# Patient Record
Sex: FEMALE | Race: BLACK OR AFRICAN AMERICAN | Employment: PART TIME | URBAN - METROPOLITAN AREA
[De-identification: names, ages, dates, MRNs, and addresses within clinical notes are randomized per-mention and may not be internally consistent; named-entity substitution may affect disease eponyms.]

---

## 2017-08-04 PROCEDURE — 93005 ELECTROCARDIOGRAM TRACING: CPT

## 2017-08-04 PROCEDURE — 99285 EMERGENCY DEPT VISIT HI MDM: CPT

## 2017-08-05 ENCOUNTER — APPOINTMENT (OUTPATIENT)
Dept: CT IMAGING | Age: 47
End: 2017-08-05
Attending: EMERGENCY MEDICINE
Payer: COMMERCIAL

## 2017-08-05 ENCOUNTER — HOSPITAL ENCOUNTER (EMERGENCY)
Age: 47
Discharge: HOME | End: 2017-08-05
Attending: EMERGENCY MEDICINE
Payer: COMMERCIAL

## 2017-08-05 ENCOUNTER — APPOINTMENT (OUTPATIENT)
Dept: ULTRASOUND IMAGING | Age: 47
End: 2017-08-05
Attending: EMERGENCY MEDICINE
Payer: COMMERCIAL

## 2017-08-05 VITALS
HEIGHT: 62 IN | BODY MASS INDEX: 37.41 KG/M2 | SYSTOLIC BLOOD PRESSURE: 169 MMHG | HEART RATE: 103 BPM | RESPIRATION RATE: 17 BRPM | OXYGEN SATURATION: 100 % | TEMPERATURE: 98.3 F | WEIGHT: 203.26 LBS | DIASTOLIC BLOOD PRESSURE: 98 MMHG

## 2017-08-05 DIAGNOSIS — R00.0 TACHYCARDIA: ICD-10-CM

## 2017-08-05 DIAGNOSIS — I10 ESSENTIAL HYPERTENSION: Primary | ICD-10-CM

## 2017-08-05 LAB
ALBUMIN SERPL BCP-MCNC: 3.8 G/DL (ref 3.5–5)
ALBUMIN/GLOB SERPL: 0.9 {RATIO} (ref 1.1–2.2)
ALP SERPL-CCNC: 92 U/L (ref 45–117)
ALT SERPL-CCNC: 27 U/L (ref 12–78)
ANION GAP BLD CALC-SCNC: 7 MMOL/L (ref 5–15)
APPEARANCE UR: CLEAR
AST SERPL W P-5'-P-CCNC: 26 U/L (ref 15–37)
ATRIAL RATE: 105 BPM
BACTERIA URNS QL MICRO: NEGATIVE /HPF
BASOPHILS # BLD AUTO: 0 K/UL (ref 0–0.1)
BASOPHILS # BLD: 0 % (ref 0–1)
BILIRUB SERPL-MCNC: 0.4 MG/DL (ref 0.2–1)
BILIRUB UR QL: NEGATIVE
BUN SERPL-MCNC: 12 MG/DL (ref 6–20)
BUN/CREAT SERPL: 13 (ref 12–20)
CALCIUM SERPL-MCNC: 9.7 MG/DL (ref 8.5–10.1)
CALCULATED P AXIS, ECG09: 59 DEGREES
CALCULATED R AXIS, ECG10: -4 DEGREES
CALCULATED T AXIS, ECG11: 50 DEGREES
CHLORIDE SERPL-SCNC: 105 MMOL/L (ref 97–108)
CK MB CFR SERPL CALC: ABNORMAL % (ref 0–2.5)
CK MB SERPL-MCNC: <1 NG/ML (ref 5–25)
CK SERPL-CCNC: 230 U/L (ref 26–192)
CO2 SERPL-SCNC: 27 MMOL/L (ref 21–32)
COLOR UR: ABNORMAL
CREAT SERPL-MCNC: 0.89 MG/DL (ref 0.55–1.02)
D DIMER PPP FEU-MCNC: 0.69 MG/L FEU (ref 0–0.65)
DIAGNOSIS, 93000: NORMAL
EOSINOPHIL # BLD: 0.2 K/UL (ref 0–0.4)
EOSINOPHIL NFR BLD: 2 % (ref 0–7)
EPITH CASTS URNS QL MICRO: ABNORMAL /LPF
ERYTHROCYTE [DISTWIDTH] IN BLOOD BY AUTOMATED COUNT: 14.2 % (ref 11.5–14.5)
GLOBULIN SER CALC-MCNC: 4.1 G/DL (ref 2–4)
GLUCOSE SERPL-MCNC: 119 MG/DL (ref 65–100)
GLUCOSE UR STRIP.AUTO-MCNC: NEGATIVE MG/DL
HCG UR QL: NEGATIVE
HCT VFR BLD AUTO: 37.6 % (ref 35–47)
HGB BLD-MCNC: 12.8 G/DL (ref 11.5–16)
HGB UR QL STRIP: NEGATIVE
HYALINE CASTS URNS QL MICRO: ABNORMAL /LPF (ref 0–5)
KETONES UR QL STRIP.AUTO: NEGATIVE MG/DL
LEUKOCYTE ESTERASE UR QL STRIP.AUTO: ABNORMAL
LYMPHOCYTES # BLD AUTO: 20 % (ref 12–49)
LYMPHOCYTES # BLD: 1.9 K/UL (ref 0.8–3.5)
MCH RBC QN AUTO: 27.8 PG (ref 26–34)
MCHC RBC AUTO-ENTMCNC: 34 G/DL (ref 30–36.5)
MCV RBC AUTO: 81.7 FL (ref 80–99)
MONOCYTES # BLD: 0.3 K/UL (ref 0–1)
MONOCYTES NFR BLD AUTO: 3 % (ref 5–13)
NEUTS SEG # BLD: 7.1 K/UL (ref 1.8–8)
NEUTS SEG NFR BLD AUTO: 75 % (ref 32–75)
NITRITE UR QL STRIP.AUTO: NEGATIVE
P-R INTERVAL, ECG05: 174 MS
PH UR STRIP: 7.5 [PH] (ref 5–8)
PLATELET # BLD AUTO: 307 K/UL (ref 150–400)
POTASSIUM SERPL-SCNC: 3.2 MMOL/L (ref 3.5–5.1)
PROT SERPL-MCNC: 7.9 G/DL (ref 6.4–8.2)
PROT UR STRIP-MCNC: NEGATIVE MG/DL
Q-T INTERVAL, ECG07: 360 MS
QRS DURATION, ECG06: 90 MS
QTC CALCULATION (BEZET), ECG08: 475 MS
RBC # BLD AUTO: 4.6 M/UL (ref 3.8–5.2)
RBC #/AREA URNS HPF: ABNORMAL /HPF (ref 0–5)
SODIUM SERPL-SCNC: 139 MMOL/L (ref 136–145)
SP GR UR REFRACTOMETRY: 1.01 (ref 1–1.03)
TROPONIN I SERPL-MCNC: <0.04 NG/ML
UA: UC IF INDICATED,UAUC: ABNORMAL
UROBILINOGEN UR QL STRIP.AUTO: 0.2 EU/DL (ref 0.2–1)
VENTRICULAR RATE, ECG03: 105 BPM
WBC # BLD AUTO: 9.4 K/UL (ref 3.6–11)
WBC URNS QL MICRO: ABNORMAL /HPF (ref 0–4)

## 2017-08-05 PROCEDURE — 81001 URINALYSIS AUTO W/SCOPE: CPT | Performed by: EMERGENCY MEDICINE

## 2017-08-05 PROCEDURE — 84484 ASSAY OF TROPONIN QUANT: CPT | Performed by: EMERGENCY MEDICINE

## 2017-08-05 PROCEDURE — 85025 COMPLETE CBC W/AUTO DIFF WBC: CPT | Performed by: EMERGENCY MEDICINE

## 2017-08-05 PROCEDURE — 81025 URINE PREGNANCY TEST: CPT | Performed by: EMERGENCY MEDICINE

## 2017-08-05 PROCEDURE — 74011250636 HC RX REV CODE- 250/636: Performed by: EMERGENCY MEDICINE

## 2017-08-05 PROCEDURE — 93971 EXTREMITY STUDY: CPT

## 2017-08-05 PROCEDURE — 74011636320 HC RX REV CODE- 636/320: Performed by: EMERGENCY MEDICINE

## 2017-08-05 PROCEDURE — 82550 ASSAY OF CK (CPK): CPT | Performed by: EMERGENCY MEDICINE

## 2017-08-05 PROCEDURE — 85379 FIBRIN DEGRADATION QUANT: CPT | Performed by: EMERGENCY MEDICINE

## 2017-08-05 PROCEDURE — 36415 COLL VENOUS BLD VENIPUNCTURE: CPT | Performed by: EMERGENCY MEDICINE

## 2017-08-05 PROCEDURE — 74011250637 HC RX REV CODE- 250/637: Performed by: EMERGENCY MEDICINE

## 2017-08-05 PROCEDURE — 80053 COMPREHEN METABOLIC PANEL: CPT | Performed by: EMERGENCY MEDICINE

## 2017-08-05 PROCEDURE — 71275 CT ANGIOGRAPHY CHEST: CPT

## 2017-08-05 RX ORDER — SUCRALFATE 1 G/10ML
1 SUSPENSION ORAL
Status: COMPLETED | OUTPATIENT
Start: 2017-08-05 | End: 2017-08-05

## 2017-08-05 RX ORDER — SODIUM CHLORIDE 9 MG/ML
50 INJECTION, SOLUTION INTRAVENOUS
Status: COMPLETED | OUTPATIENT
Start: 2017-08-05 | End: 2017-08-05

## 2017-08-05 RX ORDER — FAMOTIDINE 20 MG/1
20 TABLET, FILM COATED ORAL
Status: COMPLETED | OUTPATIENT
Start: 2017-08-05 | End: 2017-08-05

## 2017-08-05 RX ORDER — SODIUM CHLORIDE 0.9 % (FLUSH) 0.9 %
10 SYRINGE (ML) INJECTION
Status: COMPLETED | OUTPATIENT
Start: 2017-08-05 | End: 2017-08-05

## 2017-08-05 RX ADMIN — IOPAMIDOL 100 ML: 755 INJECTION, SOLUTION INTRAVENOUS at 02:25

## 2017-08-05 RX ADMIN — SUCRALFATE 1 G: 1 SUSPENSION ORAL at 00:34

## 2017-08-05 RX ADMIN — Medication 10 ML: at 02:26

## 2017-08-05 RX ADMIN — SODIUM CHLORIDE 50 ML/HR: 900 INJECTION, SOLUTION INTRAVENOUS at 02:25

## 2017-08-05 RX ADMIN — FAMOTIDINE 20 MG: 20 TABLET ORAL at 00:34

## 2017-08-05 NOTE — LETTER
[address] 
[facility] EMERGENCY DEPT 
[address] [place] [address] [address] [id number] [phone] Work/School Note Date: 8/4/2017 To Whom It May concern: 
 
Rabia Brandon was seen and treated today in the emergency room by the following provider(s): 
Attending Provider: Todd Luu MD.   
 
Rabia Brandon may return to work on 8/7/17. Sincerely, 
 
 
 
 
Todd Luu MD

## 2017-08-05 NOTE — DISCHARGE INSTRUCTIONS
DASH Diet: Care Instructions  Your Care Instructions  The DASH diet is an eating plan that can help lower your blood pressure. DASH stands for Dietary Approaches to Stop Hypertension. Hypertension is high blood pressure. The DASH diet focuses on eating foods that are high in calcium, potassium, and magnesium. These nutrients can lower blood pressure. The foods that are highest in these nutrients are fruits, vegetables, low-fat dairy products, nuts, seeds, and legumes. But taking calcium, potassium, and magnesium supplements instead of eating foods that are high in those nutrients does not have the same effect. The DASH diet also includes whole grains, fish, and poultry. The DASH diet is one of several lifestyle changes your doctor may recommend to lower your high blood pressure. Your doctor may also want you to decrease the amount of sodium in your diet. Lowering sodium while following the DASH diet can lower blood pressure even further than just the DASH diet alone. Follow-up care is a key part of your treatment and safety. Be sure to make and go to all appointments, and call your doctor if you are having problems. It's also a good idea to know your test results and keep a list of the medicines you take. How can you care for yourself at home? Following the DASH diet  · Eat 4 to 5 servings of fruit each day. A serving is 1 medium-sized piece of fruit, ½ cup chopped or canned fruit, 1/4 cup dried fruit, or 4 ounces (½ cup) of fruit juice. Choose fruit more often than fruit juice. · Eat 4 to 5 servings of vegetables each day. A serving is 1 cup of lettuce or raw leafy vegetables, ½ cup of chopped or cooked vegetables, or 4 ounces (½ cup) of vegetable juice. Choose vegetables more often than vegetable juice. · Get 2 to 3 servings of low-fat and fat-free dairy each day. A serving is 8 ounces of milk, 1 cup of yogurt, or 1 ½ ounces of cheese. · Eat 6 to 8 servings of grains each day.  A serving is 1 slice of bread, 1 ounce of dry cereal, or ½ cup of cooked rice, pasta, or cooked cereal. Try to choose whole-grain products as much as possible. · Limit lean meat, poultry, and fish to 2 servings each day. A serving is 3 ounces, about the size of a deck of cards. · Eat 4 to 5 servings of nuts, seeds, and legumes (cooked dried beans, lentils, and split peas) each week. A serving is 1/3 cup of nuts, 2 tablespoons of seeds, or ½ cup of cooked beans or peas. · Limit fats and oils to 2 to 3 servings each day. A serving is 1 teaspoon of vegetable oil or 2 tablespoons of salad dressing. · Limit sweets and added sugars to 5 servings or less a week. A serving is 1 tablespoon jelly or jam, ½ cup sorbet, or 1 cup of lemonade. · Eat less than 2,300 milligrams (mg) of sodium a day. If you limit your sodium to 1,500 mg a day, you can lower your blood pressure even more. Tips for success  · Start small. Do not try to make dramatic changes to your diet all at once. You might feel that you are missing out on your favorite foods and then be more likely to not follow the plan. Make small changes, and stick with them. Once those changes become habit, add a few more changes. · Try some of the following:  ¨ Make it a goal to eat a fruit or vegetable at every meal and at snacks. This will make it easy to get the recommended amount of fruits and vegetables each day. ¨ Try yogurt topped with fruit and nuts for a snack or healthy dessert. ¨ Add lettuce, tomato, cucumber, and onion to sandwiches. ¨ Combine a ready-made pizza crust with low-fat mozzarella cheese and lots of vegetable toppings. Try using tomatoes, squash, spinach, broccoli, carrots, cauliflower, and onions. ¨ Have a variety of cut-up vegetables with a low-fat dip as an appetizer instead of chips and dip. ¨ Sprinkle sunflower seeds or chopped almonds over salads. Or try adding chopped walnuts or almonds to cooked vegetables. ¨ Try some vegetarian meals using beans and peas. Add garbanzo or kidney beans to salads. Make burritos and tacos with mashed bush beans or black beans. Where can you learn more? Go to http://ilya-cristi.info/. Enter E846 in the search box to learn more about \"DASH Diet: Care Instructions. \"  Current as of: April 3, 2017  Content Version: 11.3  © 5990-6663 [employer]. Care instructions adapted under license by [employer] (which disclaims liability or warranty for this information). If you have questions about a medical condition or this instruction, always ask your healthcare professional. [place] [address] any warranty or liability for your use of this information. High Blood Pressure: Care Instructions  Your Care Instructions  If your blood pressure is usually above 140/90, you have high blood pressure, or hypertension. That means the top number is 140 or higher or the bottom number is 90 or higher, or both. Despite what a lot of people think, high blood pressure usually doesn't cause headaches or make you feel dizzy or lightheaded. It usually has no symptoms. But it does increase your risk for heart attack, stroke, and kidney or eye damage. The higher your blood pressure, the more your risk increases. Your doctor will give you a goal for your blood pressure. Your goal will be based on your health and your age. An example of a goal is to keep your blood pressure below 140/90. Lifestyle changes, such as eating healthy and being active, are always important to help lower blood pressure. You might also take medicine to reach your blood pressure goal.  Follow-up care is a key part of your treatment and safety. Be sure to make and go to all appointments, and call your doctor if you are having problems. It's also a good idea to know your test results and keep a list of the medicines you take. How can you care for yourself at home?   Medical treatment  · If you stop taking your medicine, your blood pressure will go back up. You may take one or more types of medicine to lower your blood pressure. Be safe with medicines. Take your medicine exactly as prescribed. Call your doctor if you think you are having a problem with your medicine. · Talk to your doctor before you start taking aspirin every day. Aspirin can help certain people lower their risk of a heart attack or stroke. But taking aspirin isn't right for everyone, because it can cause serious bleeding. · See your doctor regularly. You may need to see the doctor more often at first or until your blood pressure comes down. · If you are taking blood pressure medicine, talk to your doctor before you take decongestants or anti-inflammatory medicine, such as ibuprofen. Some of these medicines can raise blood pressure. · Learn how to check your blood pressure at home. Lifestyle changes  · Stay at a healthy weight. This is especially important if you put on weight around the waist. Losing even 10 pounds can help you lower your blood pressure. · If your doctor recommends it, get more exercise. Walking is a good choice. Bit by bit, increase the amount you walk every day. Try for at least 30 minutes on most days of the week. You also may want to swim, bike, or do other activities. · Avoid or limit alcohol. Talk to your doctor about whether you can drink any alcohol. · Try to limit how much sodium you eat to less than 2,300 milligrams (mg) a day. Your doctor may ask you to try to eat less than 1,500 mg a day. · Eat plenty of fruits (such as bananas and oranges), vegetables, legumes, whole grains, and low-fat dairy products. · Lower the amount of saturated fat in your diet. Saturated fat is found in animal products such as milk, cheese, and meat. Limiting these foods may help you lose weight and also lower your risk for heart disease. · Do not smoke. Smoking increases your risk for heart attack and stroke.  If you need help quitting, talk to your doctor about stop-smoking programs and medicines. These can increase your chances of quitting for good. When should you call for help? Call 911 anytime you think you may need emergency care. This may mean having symptoms that suggest that your blood pressure is causing a serious heart or blood vessel problem. Your blood pressure may be over 180/110. For example, call 911 if:  · You have symptoms of a heart attack. These may include:  ¨ Chest pain or pressure, or a strange feeling in the chest.  ¨ Sweating. ¨ Shortness of breath. ¨ Nausea or vomiting. ¨ Pain, pressure, or a strange feeling in the back, neck, jaw, or upper belly or in one or both shoulders or arms. ¨ Lightheadedness or sudden weakness. ¨ A fast or irregular heartbeat. · You have symptoms of a stroke. These may include:  ¨ Sudden numbness, tingling, weakness, or loss of movement in your face, arm, or leg, especially on only one side of your body. ¨ Sudden vision changes. ¨ Sudden trouble speaking. ¨ Sudden confusion or trouble understanding simple statements. ¨ Sudden problems with walking or balance. ¨ A sudden, severe headache that is different from past headaches. · You have severe back or belly pain. Do not wait until your blood pressure comes down on its own. Get help right away. Call your doctor now or seek immediate care if:  · Your blood pressure is much higher than normal (such as 180/110 or higher), but you don't have symptoms. · You think high blood pressure is causing symptoms, such as:  ¨ Severe headache. ¨ Blurry vision. Watch closely for changes in your health, and be sure to contact your doctor if:  · Your blood pressure measures 140/90 or higher at least 2 times. That means the top number is 140 or higher or the bottom number is 90 or higher, or both. · You think you may be having side effects from your blood pressure medicine. · Your blood pressure is usually normal, but it goes above normal at least 2 times.   Where can you learn more? Go to http://ilya-cristi.info/. Enter G123 in the search box to learn more about \"High Blood Pressure: Care Instructions. \"  Current as of: August 8, 2016  Content Version: 11.3  © 2511-7150 [employer]. Care instructions adapted under license by [employer] (which disclaims liability or warranty for this information). If you have questions about a medical condition or this instruction, always ask your healthcare professional. [place] [address] any warranty or liability for your use of this information.

## 2017-08-05 NOTE — ED PROVIDER NOTES
HPI Comments: Matt France is a 50 y.o. female with PMHx significant for GERD and hypercholesteremia presenting ambulatory to [facility] ED with c/o 6/10 anterior chest pain that has been constant all day today. She reports additional sx of nausea and chills. The pt notes that she has experienced similar chest pain before due to her Hx of GERD. She states that she took some Pepto bismol today with no relief of her sx. She reports that Nexium usually works better for her GERD than Pepto Bismol but states that she did not take the Nexium today because she is trying not to take it as much. She reports that she also checked her blood pressure after work today and notes that it was 170/107. She notes that she when she checked it again it lulú to 198/116. She states that her blood pressure is usually in the 140s. She reports taking Hydrochlorothiazide 12.5 mg to control her blood pressure. She denies SOB, headache, blurry vision, fever, n/v/d, decreased appetite or exposure to any new stressors. PCP: Jonny Espinal MD  PSHx: cholecystectomy,    Social History:  (-) Tobacco,   (-) EtOH,      (-) Drugs     There are no other complaints, changes, or physical findings at this time. The history is provided by the patient. Past Medical History:   Diagnosis Date    Gastrointestinal disorder     GERD    Hypertension     Ill-defined condition     High Cholesterol       Past Surgical History:   Procedure Laterality Date    BREAST SURGERY PROCEDURE UNLISTED      reduction    HX CHOLECYSTECTOMY      HX GYN      c section         No family history on file. Social History     Social History    Marital status: SINGLE     Spouse name: N/A    Number of children: N/A    Years of education: N/A     Occupational History    Not on file.      Social History Main Topics    Smoking status: Never Smoker    Smokeless tobacco: Not on file    Alcohol use No    Drug use: No    Sexual activity: Not on file Other Topics Concern    Not on file     Social History Narrative    No narrative on file         ALLERGIES: Review of patient's allergies indicates no known allergies. Review of Systems   Constitutional: Positive for chills. Negative for activity change, appetite change, fever and unexpected weight change. HENT: Negative for congestion. Eyes: Negative for pain and visual disturbance. Respiratory: Negative for cough and shortness of breath. Cardiovascular: Positive for chest pain. Gastrointestinal: Positive for nausea. Negative for abdominal pain, diarrhea and vomiting. Genitourinary: Negative for dysuria. Musculoskeletal: Negative for back pain. Skin: Negative for rash. Neurological: Negative for headaches. All other systems reviewed and are negative. Vitals:    08/05/17 0200 08/05/17 0244 08/05/17 0245 08/05/17 0300   BP: (!) 166/101 (!) 148/96  (!) 169/98   Pulse: (!) 106  (!) 109 (!) 103   Resp: 21  13 17   Temp:       SpO2: 99%  99% 100%   Weight:       Height:                Physical Exam   Constitutional: She is oriented to person, place, and time. She appears well-developed and well-nourished. HENT:   Head: Normocephalic and atraumatic. Mouth/Throat: Oropharynx is clear and moist.   Eyes: Conjunctivae and EOM are normal. Pupils are equal, round, and reactive to light. Right eye exhibits no discharge. Left eye exhibits no discharge. Neck: Normal range of motion. Neck supple. Cardiovascular: Normal heart sounds. Tachycardia present. No murmur heard. Pulmonary/Chest: Effort normal and breath sounds normal. No respiratory distress. She has no wheezes. She has no rales. Abdominal: Soft. Bowel sounds are normal. She exhibits no distension. There is no tenderness. Musculoskeletal: Normal range of motion. Neurological: She is alert and oriented to person, place, and time. No cranial nerve deficit. She exhibits normal muscle tone. Skin: Skin is warm and dry.  No rash noted. She is not diaphoretic. Psychiatric: Her mood appears anxious (mildly). Nursing note and vitals reviewed. MDM  Number of Diagnoses or Management Options  Essential hypertension:   Tachycardia:   Diagnosis management comments:   Sudden and extreme SBP change with ARIA on EKG (without heart strain). Low risk PE but given tachycardia, EKG with left leg pain x2 days, will check dimer with troponin and renal function. Amount and/or Complexity of Data Reviewed  Clinical lab tests: ordered and reviewed  Tests in the radiology section of CPT®: ordered and reviewed  Tests in the medicine section of CPT®: ordered and reviewed  Review and summarize past medical records: yes  Independent visualization of images, tracings, or specimens: yes    Patient Progress  Patient progress: stable    ED Course       Procedures   EKG: normal EKG, normal sinus rhythm, nonspecific ST and T waves changes, sinus tachycardia.     01:30 Discussed ddimer results. Pt updated regarding delay and need for further studies. Await CTA.    03:00AM Discussed CTA results. HR remains elevated 100 at baseline with increased to 110s and 120 when speaking. Pt states she is just nervous. Await US -LE. SBP improved. 4:30 AM  [address] final results have been reviewed with her. She has been counseled regarding her diagnosis. She verbally conveys understanding and agreement of the signs, symptoms, diagnosis, treatment and prognosis .      LABORATORY TESTS:  Patient Vitals for the past 12 hrs:   Temp Pulse Resp BP SpO2   08/05/17 0300 - (!) 103 17 (!) 169/98 100 %   08/05/17 0245 - (!) 109 13 - 99 %   08/05/17 0244 - - - (!) 148/96 -   08/05/17 0200 - (!) 106 21 (!) 166/101 99 %   08/05/17 0132 - 97 13 (!) 157/102 99 %   08/05/17 0100 - 98 16 - 98 %   08/05/17 0017 - (!) 110 18 - 100 %   08/04/17 2349 98.3 °F (36.8 °C) (!) 116 16 (!) 197/97 100 %         IMAGING RESULTS:  CTA CHEST W OR W WO CONT   Final Result INDICATION:  LLE pain, +DDImer      EXAMINATION:  US VENOUS DOPPLER LEFT UNI     COMPARISON: None     FINDINGS:     Duplex venous Doppler examination was performed from the left inguinal ligament  to the mid-calf. Spectral analysis and color wave-form imaging were performed. There is no deep venous thrombosis.     IMPRESSION  IMPRESSION:     No deep venous thrombosis. INDICATION:  r/o PE      EXAM:  CTA Chest with contrast for Pulmonary Embolus     COMPARISON:  June 13, 2009     TECHNIQUE:  Following the uneventful intravenous administration of 100 cc Isovue  599, thin helical axial images were obtained through the chest. 3D image  postprocessing was performed. CT dose reduction was achieved through use of a  standardized protocol tailored for this examination and automatic exposure  control for dose modulation.     FINDINGS:     THYROID: Unremarkable. MEDIASTINUM/OSWALDO: Calcified right hilar lymph nodes. HEART/PERICARDIUM: Unremarkable. PULMONARY ARTERIES:No pulmonary embolism. AORTA:  No aneurysm. LUNGS/PLEURA: No nodule, mass, or airspace disease. INCIDENTALLY IMAGED UPPER ABDOMEN: No significant abnormality. BONES: No destructive bone lesion. ADDITIONAL COMMENTS:  N/A     IMPRESSION  IMPRESSION:  No acute process.       MEDICATIONS GIVEN:  Medications   sucralfate (CARAFATE) 100 mg/mL oral suspension 1 g (1 g Oral Given 8/5/17 0034)   famotidine (PEPCID) tablet 20 mg (20 mg Oral Given 8/5/17 0034)   0.9% sodium chloride infusion (0 mL/hr IntraVENous IV Completed 8/5/17 0255)   iopamidol (ISOVUE-370) 76 % injection 100 mL (100 mL IntraVENous Given 8/5/17 0225)   sodium chloride (NS) flush 10 mL (10 mL IntraVENous Given 8/5/17 0226)       IMPRESSION:  1. Essential hypertension    2. Tachycardia        PLAN:  1. Discharge Medication List as of 8/5/2017  3:08 AM        2.    Follow-up Information     Follow up With Details Comments Contact Info    [facility] EMERGENCY DEPT  If symptoms worsen [address] [facility]  [phone]        Return to ED if worse     DISCHARGE NOTE  3:11 AM  The patient has been re-evaluated and is ready for discharge. Reviewed available results with patient. Counseled pt on diagnosis and care plan. Pt has expressed understanding, and all questions have been answered. Pt agrees with plan and agrees to F/U as recommended, or return to the ED if their sxs worsen. Discharge instructions have been provided and explained to the pt, along with reasons to return to the ED. This note is prepared by Bri Soler, acting as Scribe for Patricia León MD.    Patricia León MD: The scribe's documentation has been prepared under my direction and personally reviewed by me in its entirety. I confirm that the note above accurately reflects all work, treatment, procedures, and medical decision making performed by me.

## 2017-08-05 NOTE — ED NOTES
Assumed care of pt from triage. Pt complaining of chest pain all day. Pt has hx of GERD, HTN, and high cholesterol. Pt denies SOB, nausea, vomiting. Pt took pepto bismol approximately 2230. Pt hypertensive at this time. Pt sinus tachycardia on monitor. Pt in no acute distress at this time. Family at bedside. Pt A&Ox4. Call bell within reach.

## 2017-12-20 ENCOUNTER — APPOINTMENT (OUTPATIENT)
Dept: GENERAL RADIOLOGY | Age: 47
End: 2017-12-20
Attending: EMERGENCY MEDICINE
Payer: COMMERCIAL

## 2017-12-20 ENCOUNTER — HOSPITAL ENCOUNTER (EMERGENCY)
Age: 47
Discharge: HOME | End: 2017-12-20
Attending: EMERGENCY MEDICINE
Payer: COMMERCIAL

## 2017-12-20 VITALS
OXYGEN SATURATION: 99 % | HEART RATE: 78 BPM | TEMPERATURE: 98.4 F | BODY MASS INDEX: 36.47 KG/M2 | RESPIRATION RATE: 16 BRPM | HEIGHT: 62 IN | SYSTOLIC BLOOD PRESSURE: 156 MMHG | WEIGHT: 198.19 LBS | DIASTOLIC BLOOD PRESSURE: 98 MMHG

## 2017-12-20 DIAGNOSIS — I10 ASYMPTOMATIC HYPERTENSION: Primary | ICD-10-CM

## 2017-12-20 LAB
ALBUMIN SERPL-MCNC: 3.8 G/DL (ref 3.5–5)
ALBUMIN/GLOB SERPL: 1 {RATIO} (ref 1.1–2.2)
ALP SERPL-CCNC: 88 U/L (ref 45–117)
ALT SERPL-CCNC: 21 U/L (ref 12–78)
ANION GAP SERPL CALC-SCNC: 6 MMOL/L (ref 5–15)
AST SERPL-CCNC: 21 U/L (ref 15–37)
BASOPHILS # BLD: 0 K/UL (ref 0–0.1)
BASOPHILS NFR BLD: 0 % (ref 0–1)
BILIRUB SERPL-MCNC: 0.4 MG/DL (ref 0.2–1)
BUN SERPL-MCNC: 14 MG/DL (ref 6–20)
BUN/CREAT SERPL: 14 (ref 12–20)
CALCIUM SERPL-MCNC: 9 MG/DL (ref 8.5–10.1)
CHLORIDE SERPL-SCNC: 103 MMOL/L (ref 97–108)
CK SERPL-CCNC: 187 U/L (ref 26–192)
CO2 SERPL-SCNC: 28 MMOL/L (ref 21–32)
CREAT SERPL-MCNC: 0.98 MG/DL (ref 0.55–1.02)
EOSINOPHIL # BLD: 0.1 K/UL (ref 0–0.4)
EOSINOPHIL NFR BLD: 1 % (ref 0–7)
ERYTHROCYTE [DISTWIDTH] IN BLOOD BY AUTOMATED COUNT: 14 % (ref 11.5–14.5)
GLOBULIN SER CALC-MCNC: 4 G/DL (ref 2–4)
GLUCOSE SERPL-MCNC: 91 MG/DL (ref 65–100)
HCT VFR BLD AUTO: 36.1 % (ref 35–47)
HGB BLD-MCNC: 12.4 G/DL (ref 11.5–16)
LYMPHOCYTES # BLD: 2 K/UL (ref 0.8–3.5)
LYMPHOCYTES NFR BLD: 24 % (ref 12–49)
MCH RBC QN AUTO: 27.9 PG (ref 26–34)
MCHC RBC AUTO-ENTMCNC: 34.3 G/DL (ref 30–36.5)
MCV RBC AUTO: 81.3 FL (ref 80–99)
MONOCYTES # BLD: 0.5 K/UL (ref 0–1)
MONOCYTES NFR BLD: 6 % (ref 5–13)
NEUTS SEG # BLD: 5.6 K/UL (ref 1.8–8)
NEUTS SEG NFR BLD: 69 % (ref 32–75)
PLATELET # BLD AUTO: 295 K/UL (ref 150–400)
POTASSIUM SERPL-SCNC: 2.8 MMOL/L (ref 3.5–5.1)
PROT SERPL-MCNC: 7.8 G/DL (ref 6.4–8.2)
RBC # BLD AUTO: 4.44 M/UL (ref 3.8–5.2)
SODIUM SERPL-SCNC: 137 MMOL/L (ref 136–145)
TROPONIN I SERPL-MCNC: <0.04 NG/ML
WBC # BLD AUTO: 8.2 K/UL (ref 3.6–11)

## 2017-12-20 PROCEDURE — 85025 COMPLETE CBC W/AUTO DIFF WBC: CPT | Performed by: EMERGENCY MEDICINE

## 2017-12-20 PROCEDURE — 71010 XR CHEST PORT: CPT

## 2017-12-20 PROCEDURE — 80053 COMPREHEN METABOLIC PANEL: CPT | Performed by: EMERGENCY MEDICINE

## 2017-12-20 PROCEDURE — 74011250637 HC RX REV CODE- 250/637: Performed by: EMERGENCY MEDICINE

## 2017-12-20 PROCEDURE — 99285 EMERGENCY DEPT VISIT HI MDM: CPT

## 2017-12-20 PROCEDURE — 84484 ASSAY OF TROPONIN QUANT: CPT | Performed by: EMERGENCY MEDICINE

## 2017-12-20 PROCEDURE — 93005 ELECTROCARDIOGRAM TRACING: CPT

## 2017-12-20 PROCEDURE — 82550 ASSAY OF CK (CPK): CPT | Performed by: EMERGENCY MEDICINE

## 2017-12-20 PROCEDURE — 36415 COLL VENOUS BLD VENIPUNCTURE: CPT | Performed by: EMERGENCY MEDICINE

## 2017-12-20 RX ORDER — METOPROLOL TARTRATE 25 MG/1
25 TABLET, FILM COATED ORAL
Status: DISCONTINUED | OUTPATIENT
Start: 2017-12-20 | End: 2017-12-20

## 2017-12-20 RX ORDER — POTASSIUM CHLORIDE 750 MG/1
40 TABLET, FILM COATED, EXTENDED RELEASE ORAL
Status: COMPLETED | OUTPATIENT
Start: 2017-12-20 | End: 2017-12-20

## 2017-12-20 RX ADMIN — POTASSIUM CHLORIDE 40 MEQ: 750 TABLET, FILM COATED, EXTENDED RELEASE ORAL at 18:38

## 2017-12-20 NOTE — ED NOTES
Patient ambulatory to ED with c/o high blood pressure. Patient checks her own BP at home and states it was high this morning. Patient also stated she was feeling lightheaded today. Patient denies chest pain, SOB or dizziness.

## 2017-12-20 NOTE — DISCHARGE INSTRUCTIONS

## 2017-12-20 NOTE — ED PROVIDER NOTES
EMERGENCY DEPARTMENT HISTORY AND PHYSICAL EXAM      Date: 12/20/2017  Patient Name: Sarah Dawson    History of Presenting Illness     Chief Complaint   Patient presents with    Dizziness     Ambulatory into the ED with c/o dizziness and high BP readings throughout the day-151/100 this morning and 154/107 pta. Denies CP, SOB. BP currently 192/119    Hypertension       History Provided By: Patient    HPI: Sarah Dawson, 50 y.o. female with PMHx significant for HTN and HLD, presents ambulatory to the ED with cc of intermittent episodes of light-headedness and a pulsing, generalized headache that started yesterday. The patient states that she took two doses of Tylenol with complete relief. She is currently denying any light-headedness of headaches at this time. She also c/o elevated blood pressure, and she reports that her blood pressure was 150s/100s this morning. She states that she is currently prescribed 10mg/12mg of Lisinopril/HCTZ and Metoprolol 25 mg. She states that her PCP has been adjusting her blood pressure medications recently. She specifically denies chest pain, SOB, abdominal pain, back pain, or other complaints at this time. There are no other complaints, changes, or physical findings at this time. PCP: Jonny Espinal MD    Current Outpatient Prescriptions   Medication Sig Dispense Refill    rosuvastatin (CRESTOR) 10 mg tablet Take 10 mg by mouth nightly.  Hydrochlorothiazide (HYDRODIURIL) 12.5 mg tablet Take 12.5 mg by mouth daily.  esomeprazole (NEXIUM) 20 mg capsule Take 20 mg by mouth daily.  lidocaine (XYLOCAINE) 2 % solution Take 10 mL by mouth as needed for Pain.  100 mL 1       Past History     Past Medical History:  Past Medical History:   Diagnosis Date    Gastrointestinal disorder     GERD    Hypertension     Ill-defined condition     High Cholesterol       Past Surgical History:  Past Surgical History:   Procedure Laterality Date    BREAST SURGERY PROCEDURE UNLISTED      reduction    HX CHOLECYSTECTOMY      HX GYN      c section       Family History:  History reviewed. No pertinent family history. Social History:  Social History   Substance Use Topics    Smoking status: Never Smoker    Smokeless tobacco: Never Used    Alcohol use No       Allergies:  No Known Allergies      Review of Systems   Review of Systems   Constitutional: Negative for chills and fever. Respiratory: Negative for cough and shortness of breath. Cardiovascular: Negative for chest pain. Gastrointestinal: Negative for abdominal pain, constipation, diarrhea, nausea and vomiting. Musculoskeletal: Negative for back pain. Neurological: Positive for light-headedness and headaches. Negative for weakness and numbness. All other systems reviewed and are negative. Physical Exam   Physical Exam   Constitutional: She is oriented to person, place, and time. She appears well-developed and well-nourished. HENT:   Head: Normocephalic. Eyes: EOM are normal. Pupils are equal, round, and reactive to light. Neck: Normal range of motion. Cardiovascular: Normal rate and regular rhythm. Pulmonary/Chest: Effort normal and breath sounds normal.   Abdominal: Soft. She exhibits no distension. There is no tenderness. Neurological: She is alert and oriented to person, place, and time. No cranial nerve deficit. CN 2-12 intact, 5/5 strength in all 4 extremities, Finger to nose intact. Skin: Skin is warm and dry. Psychiatric: She has a normal mood and affect. Nursing note and vitals reviewed.       Diagnostic Study Results     Labs -     Recent Results (from the past 12 hour(s))   EKG, 12 LEAD, INITIAL    Collection Time: 12/20/17  5:03 PM   Result Value Ref Range    Ventricular Rate 84 BPM    Atrial Rate 84 BPM    P-R Interval 166 ms    QRS Duration 94 ms    Q-T Interval 406 ms    QTC Calculation (Bezet) 479 ms    Calculated P Axis 55 degrees    Calculated R Axis 8 degrees Calculated T Axis 54 degrees    Diagnosis       Normal sinus rhythm  Cannot rule out Anterior infarct (cited on or before 31-MAR-2010)  When compared with ECG of 04-AUG-2017 23:54,  No significant change was found     CBC WITH AUTOMATED DIFF    Collection Time: 12/20/17  5:20 PM   Result Value Ref Range    WBC 8.2 3.6 - 11.0 K/uL    RBC 4.44 3.80 - 5.20 M/uL    HGB 12.4 11.5 - 16.0 g/dL    HCT 36.1 35.0 - 47.0 %    MCV 81.3 80.0 - 99.0 FL    MCH 27.9 26.0 - 34.0 PG    MCHC 34.3 30.0 - 36.5 g/dL    RDW 14.0 11.5 - 14.5 %    PLATELET 794 108 - 113 K/uL    NEUTROPHILS 69 32 - 75 %    LYMPHOCYTES 24 12 - 49 %    MONOCYTES 6 5 - 13 %    EOSINOPHILS 1 0 - 7 %    BASOPHILS 0 0 - 1 %    ABS. NEUTROPHILS 5.6 1.8 - 8.0 K/UL    ABS. LYMPHOCYTES 2.0 0.8 - 3.5 K/UL    ABS. MONOCYTES 0.5 0.0 - 1.0 K/UL    ABS. EOSINOPHILS 0.1 0.0 - 0.4 K/UL    ABS. BASOPHILS 0.0 0.0 - 0.1 K/UL   METABOLIC PANEL, COMPREHENSIVE    Collection Time: 12/20/17  5:20 PM   Result Value Ref Range    Sodium 137 136 - 145 mmol/L    Potassium 2.8 (L) 3.5 - 5.1 mmol/L    Chloride 103 97 - 108 mmol/L    CO2 28 21 - 32 mmol/L    Anion gap 6 5 - 15 mmol/L    Glucose 91 65 - 100 mg/dL    BUN 14 6 - 20 MG/DL    Creatinine 0.98 0.55 - 1.02 MG/DL    BUN/Creatinine ratio 14 12 - 20      GFR est AA >60 >60 ml/min/1.73m2    GFR est non-AA >60 >60 ml/min/1.73m2    Calcium 9.0 8.5 - 10.1 MG/DL    Bilirubin, total 0.4 0.2 - 1.0 MG/DL    ALT (SGPT) 21 12 - 78 U/L    AST (SGOT) 21 15 - 37 U/L    Alk.  phosphatase 88 45 - 117 U/L    Protein, total 7.8 6.4 - 8.2 g/dL    Albumin 3.8 3.5 - 5.0 g/dL    Globulin 4.0 2.0 - 4.0 g/dL    A-G Ratio 1.0 (L) 1.1 - 2.2     CK W/ REFLX CKMB    Collection Time: 12/20/17  5:20 PM   Result Value Ref Range     26 - 192 U/L   TROPONIN I    Collection Time: 12/20/17  5:20 PM   Result Value Ref Range    Troponin-I, Qt. <0.04 <0.05 ng/mL       Radiologic Studies -   CXR Results  (Last 48 hours)               12/20/17 0396  XR CHEST PORT Final result    Impression:  IMPRESSION:   No acute finding       Narrative:  INDICATION: Chest pain and hypertension       COMPARISON: 1/7/2015       A single frontal view was obtained. The time of this study is 1821 hours. Lungs   are clear. Heart and mediastinal shadows are stable. .                            Medical Decision Making   I am the first provider for this patient. I reviewed the vital signs, available nursing notes, past medical history, past surgical history, family history and social history. Vital Signs-Reviewed the patient's vital signs. Patient Vitals for the past 12 hrs:   Temp Pulse Resp BP SpO2   12/20/17 1830 - 78 16 (!) 156/98 99 %   12/20/17 1815 - 73 14 (!) 170/94 100 %   12/20/17 1800 - 74 16 (!) 146/91 100 %   12/20/17 1745 - 87 20 (!) 165/92 -   12/20/17 1702 - - - (!) 192/119 -   12/20/17 1701 98.4 °F (36.9 °C) 76 11 (!) 187/114 100 %     Cardiac Monitor:   Rate: 85 bpm  Rhythm: Normal Sinus Rhythm     EKG interpretation: (Preliminary) 17:03  Rhythm: normal sinus rhythm; and regular . Rate (approx.): 84; Axis: normal; NH interval: normal; QRS interval: normal ; ST/T wave: normal; Other findings: normal.  Written by MARTHA Gamble, as dictated by Caio Lomas MD.    Records Reviewed: Nursing Notes and Old Medical Records    Provider Notes (Medical Decision Making):   Patient presents with asymptomatic hypertension. Likely essential hypertension rather than secondary from renal or endocrine cause. No symptoms like CP or SOB or HA to be concerned about end-organ damage and malignant hypertension at this time. Discussed sodium restriction, maintaining ideal body weight and regular exercise program as physiologic means to achieve blood pressure control. The patient will strive towards this.  Meanwhile, it is appropriate to lower BP with medications, while observing for therapeutic effect and if appropriate later, can discuss discontinuing medications with his primary care physician if physiologic methods appear to be effective. The patient indicates understanding of these issues and agrees with the plan. We have agreed that continuing to lower BP in the Emergency Room at this point could be more deleterious than therapeutic. Discussed the long term sequelae for elevated blood pressure including blindness, CVA, MI, and renal failure/ dialysis. Pt agrees to follow up as directed. ED Course:   Initial assessment performed. The patients presenting problems have been discussed, and they are in agreement with the care plan formulated and outlined with them. I have encouraged them to ask questions as they arise throughout their visit. Progress Note:  5:46 PM  The patient was informed of the plan to give her a dose of her Metoprolol at this time. Pt is requesting that she take her home dose of Metoprolol after she is discharged home. Pt was informed of her CBC results, and she conveys her understanding of these results. Pending CMP. Written by Kina Allison ED Scribe, as dictated by Jaiden Cardoso MD.    Progress Note:  6:40 PM  The pt was informed of the rest of her results, and she conveys her understanding. Will discharge. Written by Kina Allison ED Scribdorota, as dictated by Jaiden Cardoso MD.    Critical Care Time: 0 minutes    Discharge Note:  6:45 PM  The pt is ready for discharge. The pt's signs, symptoms, diagnosis, and discharge instructions have been discussed and pt has conveyed their understanding. The pt is to follow up as recommended or return to ER should their symptoms worsen. Plan has been discussed and pt is in agreement. PLAN:  1. Discharge Medication List as of 12/20/2017  6:21 PM        2. Follow-up Information     Follow up With Details Comments Contact Tommy Espinal MD  As needed Patient can only remember the practice name and not the physician          Return to ED if worse     Diagnosis     Clinical Impression:   1.  Asymptomatic hypertension        Attestations: This note is prepared by Daniel Jara, acting as a Scribe for Fabiola Mark MD.    Fabiola Mark MD: The scribe's documentation has been prepared under my direction and personally reviewed by me in its entirety. I confirm that the notes above accurately reflects all work, treatment, procedures, and medical decision making performed by me. This note will not be viewable in [address]

## 2017-12-20 NOTE — ED NOTES
MD has reviewed discharge instructions with the patient. The patient verbalized understanding. Patient left ambulatory.

## 2017-12-21 LAB
ATRIAL RATE: 84 BPM
CALCULATED P AXIS, ECG09: 55 DEGREES
CALCULATED R AXIS, ECG10: 8 DEGREES
CALCULATED T AXIS, ECG11: 54 DEGREES
DIAGNOSIS, 93000: NORMAL
P-R INTERVAL, ECG05: 166 MS
Q-T INTERVAL, ECG07: 406 MS
QRS DURATION, ECG06: 94 MS
QTC CALCULATION (BEZET), ECG08: 479 MS
VENTRICULAR RATE, ECG03: 84 BPM

## 2018-08-30 ENCOUNTER — HOSPITAL ENCOUNTER (EMERGENCY)
Age: 48
Discharge: HOME | End: 2018-08-30
Attending: EMERGENCY MEDICINE
Payer: COMMERCIAL

## 2018-08-30 VITALS
SYSTOLIC BLOOD PRESSURE: 158 MMHG | TEMPERATURE: 98.4 F | OXYGEN SATURATION: 99 % | HEART RATE: 79 BPM | RESPIRATION RATE: 14 BRPM | WEIGHT: 207.89 LBS | BODY MASS INDEX: 38.26 KG/M2 | DIASTOLIC BLOOD PRESSURE: 92 MMHG | HEIGHT: 62 IN

## 2018-08-30 DIAGNOSIS — M54.16 LEFT LUMBAR RADICULOPATHY: Primary | ICD-10-CM

## 2018-08-30 LAB
GLUCOSE BLD STRIP.AUTO-MCNC: 114 MG/DL (ref 65–100)
SERVICE CMNT-IMP: ABNORMAL

## 2018-08-30 PROCEDURE — 82962 GLUCOSE BLOOD TEST: CPT

## 2018-08-30 PROCEDURE — 99283 EMERGENCY DEPT VISIT LOW MDM: CPT

## 2018-08-30 RX ORDER — METHYLPREDNISOLONE 4 MG/1
TABLET ORAL
Qty: 1 DOSE PACK | Refills: 0 | Status: SHIPPED | OUTPATIENT
Start: 2018-08-30

## 2018-08-30 RX ORDER — NAPROXEN 500 MG/1
500 TABLET ORAL
Qty: 20 TAB | Refills: 0 | Status: SHIPPED | OUTPATIENT
Start: 2018-08-30

## 2018-08-30 NOTE — ED PROVIDER NOTES
EMERGENCY DEPARTMENT HISTORY AND PHYSICAL EXAM 
 
 
Date: 8/30/2018 Patient Name: Sarah Dawson History of Presenting Illness Chief Complaint Patient presents with  Leg Pain History Provided By: Patient HPI: Sarah Dawson, 46 y.o. female with PMHx significant for GERD, HTN, hypercholesterolemia, presents ambulatory to the ED with cc of new onset of constant, aching LLE pain from the upper thigh to the left knee and down the left lower leg persisting over the last 1-2 weeks. Pt reports associated sx of nausea as well. She expresses over the last week she has had constant pain with intermittent shooting \"pins and needles\" in her left upper thigh noting upon waking up this morning her pain was localized to the left knee down to the foot. Pt discloses there are no exacerbating factors to her discomfort and has found no relief with Ibuprofen leading her to the ED for evaluation. She states she is sedentary while at work and denies any recent increase in exertion to cause her pain. Pt denies any h/o DM, sciatica, or arthritis. Pt denies any recent falls or trauma to cause her discomfort. She denies any fevers, chills, chest pain, SOB, abdominal pain, vomiting, diarrhea, dysuria, hematuria, gait problem, or hip pain. There are no other complaints, changes, or physical findings at this time. PCP: Renata Rodriguez MD 
 
Current Outpatient Prescriptions Medication Sig Dispense Refill  rosuvastatin (CRESTOR) 10 mg tablet Take 10 mg by mouth nightly.  Hydrochlorothiazide (HYDRODIURIL) 12.5 mg tablet Take 12.5 mg by mouth daily.  esomeprazole (NEXIUM) 20 mg capsule Take 20 mg by mouth daily.  lidocaine (XYLOCAINE) 2 % solution Take 10 mL by mouth as needed for Pain. 100 mL 1 Past History Past Medical History: 
Past Medical History:  
Diagnosis Date  Gastrointestinal disorder GERD  Hypertension  Ill-defined condition High Cholesterol Past Surgical History: 
Past Surgical History:  
Procedure Laterality Date  BREAST SURGERY PROCEDURE UNLISTED    
 reduction  HX CHOLECYSTECTOMY  HX GYN    
 c section Family History: No family history on file. Social History: 
Social History Substance Use Topics  Smoking status: Never Smoker  Smokeless tobacco: Never Used  Alcohol use No  
 
 
Allergies: 
No Known Allergies Review of Systems Review of Systems Constitutional: Negative for chills and fever. Respiratory: Negative for cough and shortness of breath. Cardiovascular: Negative for chest pain. Gastrointestinal: Positive for nausea. Negative for abdominal pain, constipation, diarrhea and vomiting. Genitourinary: Negative for dysuria and hematuria. Musculoskeletal: Positive for myalgias (left thigh to the knee and down lower leg ). Negative for arthralgias (hip pain ) and gait problem. Neurological: Positive for numbness (tingling LLE ). Negative for weakness. All other systems reviewed and are negative. Physical Exam  
Physical Exam  
Constitutional: She is oriented to person, place, and time. She appears well-developed and well-nourished. Ambulatory to the room HENT:  
Head: Normocephalic and atraumatic. Eyes: Conjunctivae and EOM are normal.  
Neck: Normal range of motion. Neck supple. Cardiovascular: Normal rate and regular rhythm. Pulmonary/Chest: Effort normal and breath sounds normal. No respiratory distress. Abdominal: Soft. She exhibits no distension. There is no tenderness. Musculoskeletal: Normal range of motion. (-) SLR Minimal tenderness over left lateral thigh No evidence of swelling Neurological: She is alert and oriented to person, place, and time. Skin: Skin is warm and dry. Psychiatric: She has a normal mood and affect. Nursing note and vitals reviewed. Diagnostic Study Results Medical Decision Making I am the first provider for this patient. I reviewed the vital signs, available nursing notes, past medical history, past surgical history, family history and social history. Vital Signs-Reviewed the patient's vital signs. Patient Vitals for the past 12 hrs: 
 Temp Pulse Resp BP SpO2  
08/30/18 0642 98.4 °F (36.9 °C) 79 14 (!) 158/92 99 % Pulse Oximetry Analysis - 99% on room air Cardiac Monitor:  
Rate: 79 bpm 
Rhythm: Normal Sinus Rhythm Records Reviewed: Nursing Notes and Old Medical Records Provider Notes (Medical Decision Making): DDx: Lumbar radiculopathy, sciatica, IT band inflammation, Muscle strain, Overuse injury, Diabetic neuropathy will get PO glucose. ED Course:  
Initial assessment performed. The patients presenting problems have been discussed, and they are in agreement with the care plan formulated and outlined with them. I have encouraged them to ask questions as they arise throughout their visit. Progress Notes: 
 
7:02 AM  
The pt has been re-evaluated. She was updated on plan for discharge with instructions to stretch the affected area with steroid rx and anti-inflammatory. She verbalizes understanding and is stable for discharge. Critical Care Time: 0 minutes Disposition: 
Discharge Note: 
7:01 AM 
The patient is ready for discharge. The patient's signs, symptoms, diagnosis, and discharge instruction have been discussed and the patient has conveyed their understanding. The patient is to follow up as recommended or return to the ER should their symptoms worsen. Plan has been discussed and the patient is in agreement. Written by Jethro Phanibdorota, as dictated by Rajeev Esquivel M.D PLAN: 
1. Current Discharge Medication List  
  
START taking these medications Details  
methylPREDNISolone (MEDROL, OTILIA,) 4 mg tablet As directed Qty: 1 Dose Pack, Refills: 0  
  
naproxen (NAPROSYN) 500 mg tablet Take 1 Tab by mouth every twelve (12) hours as needed for Pain. Qty: 20 Tab, Refills: 0  
  
  
 
2. Follow-up Information Follow up With Details Comments Contact Info Phys Other, MD Schedule an appointment as soon as possible for a visit  Patient can only remember the practice name and not the physician Return to ED if worse Diagnosis Clinical Impression: 1. Left lumbar radiculopathy Attestations: 
 
Attestation: This note is prepared by Colonel Silva. Jose, acting as Scribe for Mary Biggs M.D. Mary Biggs M.D: The scribe's documentation has been prepared under my direction and personally reviewed by me in its entirety. I confirm that the note above accurately reflects all work, treatment, procedures, and medical decision making performed by me.

## 2018-08-30 NOTE — ED NOTES
Pt discharged by MD Cele Hannah. Pt provided with discharge instructions Rx and instructions on follow up care. Pt out of ED in stable condition accompanied by self.

## 2018-08-30 NOTE — ED NOTES
Assumed care of patient at this time. She says that for about a week she has had left leg pain that is from her hip to her foot. She has been taking ibuprofen at home, but the pain has gotten worse. She said when she woke up this morning the pain was more severe in her knee. Pt complains of intermittent tingling in her foot and tenderness to her inner thigh. Pt denies injury to the leg

## 2018-08-30 NOTE — LETTER
[address] 
[facility] EMERGENCY DEPT 
[address] [place] [address] [address] [id number] 
[phone] Work/School Note Date: 8/30/2018 To Whom It May concern: 
 
Gian Mullins was seen and treated today in the emergency room by the following provider(s): 
Attending Provider: Alvino Hayes MD.   
 
Gina Mullins may return to work on 8/30/18, but should not lift anything more than 10lbs for 1 week. Sincerely, Alvino Hayes MD

## 2019-02-25 ENCOUNTER — HOSPITAL ENCOUNTER (EMERGENCY)
Age: 49
Discharge: HOME | End: 2019-02-25
Attending: EMERGENCY MEDICINE
Payer: COMMERCIAL

## 2019-02-25 ENCOUNTER — APPOINTMENT (OUTPATIENT)
Dept: GENERAL RADIOLOGY | Age: 49
End: 2019-02-25
Attending: EMERGENCY MEDICINE
Payer: COMMERCIAL

## 2019-02-25 VITALS
OXYGEN SATURATION: 100 % | DIASTOLIC BLOOD PRESSURE: 95 MMHG | TEMPERATURE: 98.3 F | SYSTOLIC BLOOD PRESSURE: 150 MMHG | HEART RATE: 73 BPM | BODY MASS INDEX: 38.02 KG/M2 | HEIGHT: 62 IN | RESPIRATION RATE: 16 BRPM

## 2019-02-25 DIAGNOSIS — J20.9 ACUTE BRONCHITIS, UNSPECIFIED ORGANISM: Primary | ICD-10-CM

## 2019-02-25 DIAGNOSIS — J02.9 ACUTE PHARYNGITIS, UNSPECIFIED ETIOLOGY: ICD-10-CM

## 2019-02-25 DIAGNOSIS — R03.0 ELEVATED BLOOD PRESSURE READING: ICD-10-CM

## 2019-02-25 PROCEDURE — 99282 EMERGENCY DEPT VISIT SF MDM: CPT

## 2019-02-25 PROCEDURE — 71046 X-RAY EXAM CHEST 2 VIEWS: CPT

## 2019-02-25 RX ORDER — PROMETHAZINE HYDROCHLORIDE AND DEXTROMETHORPHAN HYDROBROMIDE 6.25; 15 MG/5ML; MG/5ML
5 SYRUP ORAL
Qty: 118 ML | Refills: 0 | Status: SHIPPED | OUTPATIENT
Start: 2019-02-25 | End: 2019-03-03

## 2019-02-25 RX ORDER — PREDNISONE 10 MG/1
TABLET ORAL
Qty: 21 TAB | Refills: 0 | Status: SHIPPED | OUTPATIENT
Start: 2019-02-25

## 2019-02-25 RX ORDER — AZITHROMYCIN 250 MG/1
TABLET, FILM COATED ORAL
Qty: 6 TAB | Refills: 0 | Status: SHIPPED | OUTPATIENT
Start: 2019-02-25 | End: 2019-03-02

## 2019-02-25 NOTE — ED NOTES
Discharge instructions given to patient by [facility]. Pt verbalized understanding of discharge instructions. Pt discharged without difficulty. Pt. Discharged in stable condition via ambulation , accompanied by self.

## 2019-02-25 NOTE — ED NOTES
OTTO Rahman have reviewed discharge instructions with the patient. The patient verbalized understanding.

## 2019-02-25 NOTE — DISCHARGE INSTRUCTIONS
Rest, push fluids, alternate Tylenol and Motrin for fever, and follow up with PCP for recheck. Avoid any and all tobacco products. Return to the emergency department for any worsening fever, cough, chest pain, shortness of breath, decreased oral intake, or decreased urine output.

## 2019-02-25 NOTE — LETTER
[facility] EMERGENCY DEPT 
[address] [place] [address] [phone] [id number] [phone] Work/School Note Date: 2/25/2019 To Whom It May concern: 
 
Gloria Marie was seen and treated today in the emergency room. She may return to work in 1 to 2 days, as symptoms improve. Sincerely, Kelli Da Silva, [address]

## 2019-02-26 NOTE — ED PROVIDER NOTES
EMERGENCY DEPARTMENT HISTORY AND PHYSICAL EXAM 
 
 
Date: 2/25/2019 Patient Name: Soniya Owens History of Presenting Illness Chief Complaint Patient presents with  Sore Throat  
  onset Saturday  Cough  
  productive, green in color History Provided By: Patient HPI: Soniya Owens, 46 y.o. female presents ambulatory to the ED with c/o onset sore throat and cough on 2/23/19. Pt states she has been \"surrounded by sick people at [facility] person beside me had pneumonia\". Pt reported mild congestion but denied sinus pressure/pain. No fever/chills. Pt denied wheezes or bronchospasm. Pt denied h/o Asthma/COPD but has had bronchitis in the past.  She is a non smoker. No N/V/D. No chest pain or shortness of breath. Chief Complaint: sore throat, cough Duration: 3 Days Timing:  Acute Location: chest, throat Quality: Aching and Burning Severity: Moderate Modifying Factors: increased discomfort with swallowing Associated Symptoms: denies any other associated signs or symptoms There are no other complaints, changes, or physical findings at this time. PCP: Jonny Espinal MD 
 
Current Outpatient Medications Medication Sig Dispense Refill  azithromycin (ZITHROMAX Z-OTILIA) 250 mg tablet Take as directed 6 Tab 0  promethazine-dextromethorphan (PROMETHAZINE-DM) 6.25-15 mg/5 mL syrup Take 5 mL by mouth every four (4) hours as needed for Cough for up to 6 days. 118 mL 0  
 predniSONE (STERAPRED DS) 10 mg dose pack Take as directed 21 Tab 0  
 hydroCHLOROthiazide 12.5 mg cap 12.5 mg, lisinopril 5 mg tab 10 mg Take 1 Dose by mouth daily.  rosuvastatin (CRESTOR) 10 mg tablet Take 10 mg by mouth nightly.  methylPREDNISolone (MEDROL, OTILIA,) 4 mg tablet As directed 1 Dose Pack 0  
 naproxen (NAPROSYN) 500 mg tablet Take 1 Tab by mouth every twelve (12) hours as needed for Pain. 20 Tab 0  
 esomeprazole (NEXIUM) 20 mg capsule Take 20 mg by mouth daily.  lidocaine (XYLOCAINE) 2 % solution Take 10 mL by mouth as needed for Pain. 100 mL 1 Past History Past Medical History: 
Past Medical History:  
Diagnosis Date  Gastrointestinal disorder GERD  Hypertension  Ill-defined condition High Cholesterol Past Surgical History: 
Past Surgical History:  
Procedure Laterality Date  BREAST SURGERY PROCEDURE UNLISTED    
 reduction  HX CHOLECYSTECTOMY  HX GYN    
 c section Family History: 
History reviewed. No pertinent family history. Social History: 
Social History Tobacco Use  Smoking status: Never Smoker  Smokeless tobacco: Never Used Substance Use Topics  Alcohol use: No  
 Drug use: No  
 
 
Allergies: 
No Known Allergies Review of Systems Review of Systems Constitutional: Negative for chills and fever. HENT: Positive for congestion, postnasal drip and sore throat. Negative for rhinorrhea, sinus pressure, sinus pain and trouble swallowing. Eyes: Negative for discharge and redness. Respiratory: Positive for cough. Negative for shortness of breath and wheezing. Cardiovascular: Negative for chest pain and palpitations. Gastrointestinal: Negative for abdominal pain, diarrhea, nausea and vomiting. Endocrine: Negative for polydipsia, polyphagia and polyuria. Genitourinary: Negative for dysuria and hematuria. Musculoskeletal: Negative for neck pain and neck stiffness. Skin: Negative for rash and wound. Allergic/Immunologic: Negative for food allergies and immunocompromised state. Neurological: Negative for dizziness and headaches. Psychiatric/Behavioral: Negative for agitation and confusion. Physical Exam  
Physical Exam  
Constitutional: She is oriented to person, place, and time. She appears well-developed and well-nourished. No distress. WDWN AA female, alert, in NAD HENT:  
Head: Normocephalic and atraumatic. Mouth/Throat: No oropharyngeal exudate. Boggy nasal mucosa, clear rhinorrhea, posterior oropharynx injected without exudate. Increased effusion noted to bilat TMs, no erythema, good light reflex noted. Eyes: Conjunctivae and EOM are normal. Pupils are equal, round, and reactive to light. Right eye exhibits no discharge. Left eye exhibits no discharge. No scleral icterus. Neck: Normal range of motion. Neck supple. No JVD present. No tracheal deviation present. No thyromegaly present. Cardiovascular: Normal rate, regular rhythm and normal heart sounds. Pulmonary/Chest: Effort normal and breath sounds normal. No respiratory distress. She has no wheezes. Moderate nonproductive noted during exam, no appreciable wheezes Abdominal: Soft. There is no tenderness. Musculoskeletal: Normal range of motion. She exhibits no edema. Lymphadenopathy:  
  She has no cervical adenopathy. Neurological: She is alert and oriented to person, place, and time. She exhibits normal muscle tone. Coordination normal.  
Skin: Skin is warm and dry. She is not diaphoretic. No pallor. Psychiatric: She has a normal mood and affect. Her behavior is normal. Judgment normal.  
Nursing note and vitals reviewed. Diagnostic Study Results Labs - No results found for this or any previous visit (from the past 12 hour(s)). Radiologic Studies -  
XR CHEST PA LAT Final Result IMPRESSION: No acute abnormality. CXR Results  (Last 48 hours) 02/25/19 1815  XR CHEST PA LAT Final result Impression:  IMPRESSION: No acute abnormality. Narrative:  EXAM:  XR CHEST PA LAT. INDICATION: Cough. COMPARISON: 12/20/2017. FINDINGS:   
PA and lateral radiographs of the chest were obtained. Lungs: The lungs are clear of mass, nodule, airspace disease or edema. Pleura: There is no pleural effusion or pneumothorax.   
Mediastinum: The cardiac and mediastinal contours and pulmonary vascularity are  
normal.  
 Bones and soft tissues: There are mild degenerative changes of the spine. There  
are surgical clips in the right upper quadrant. Medical Decision Making I am the first provider for this patient. I reviewed the vital signs, available nursing notes, past medical history, past surgical history, family history and social history. Vital Signs-Reviewed the patient's vital signs. Patient Vitals for the past 12 hrs: 
 Temp Pulse Resp BP SpO2  
02/25/19 1848    (!) 150/95   
02/25/19 1733 98.3 °F (36.8 °C) 73 16 (!) 176/105 100 % Records Reviewed: Nursing Notes, Old Medical Records, Previous Radiology Studies and Previous Laboratory Studies Provider Notes (Medical Decision Making): URI, bronchitis, PNA 
 
ED Course:  
Initial assessment performed. The patients presenting problems have been discussed, and they are in agreement with the care plan formulated and outlined with them. I have encouraged them to ask questions as they arise throughout their visit. HTN COUNSELING Reviewed the risks of uncontrolled HTN to include stroke, heart attack, renal failure, aneurysm and death. They will take their medications daily and follow up with their PCP for recheck. DISCHARGE NOTE: 
The care plan has been outline with the patient and/or family, who verbally conveyed understanding and agreement. Available results have been reviewed. Patient and/or family understand the follow up plan as outlined and discharge instructions. Should their condition deterioration at any time after discharge the patient agrees to return, follow up sooner than outlined or seek medical assistance at the closest Emergency Room as soon as possible. Questions have been answered.  Discharge instructions and educational information regarding the patient's diagnosis as well a list of reasons why the patient would want to seek immediate medical attention, should their condition change, were reviewed directly with the patient/family PLAN: 
1. Discharge Medication List as of 2/25/2019  6:46 PM  
  
START taking these medications Details  
azithromycin (ZITHROMAX Z-OTILIA) 250 mg tablet Take as directed, Print, Disp-6 Tab, R-0  
  
promethazine-dextromethorphan (PROMETHAZINE-DM) 6.25-15 mg/5 mL syrup Take 5 mL by mouth every four (4) hours as needed for Cough for up to 6 days. , Print, Disp-118 mL, R-0  
  
predniSONE (STERAPRED DS) 10 mg dose pack Take as directed, Print, Disp-21 Tab, R-0  
  
  
CONTINUE these medications which have NOT CHANGED Details  
rosuvastatin (CRESTOR) 10 mg tablet Take 10 mg by mouth nightly., Historical Med  
  
methylPREDNISolone (MEDROL, OTILIA,) 4 mg tablet As directed, Normal, Disp-1 Dose Pack, R-0  
  
naproxen (NAPROSYN) 500 mg tablet Take 1 Tab by mouth every twelve (12) hours as needed for Pain., Normal, Disp-20 Tab, R-0  
  
esomeprazole (NEXIUM) 20 mg capsule Take 20 mg by mouth daily. , Historical Med  
  
lidocaine (XYLOCAINE) 2 % solution Take 10 mL by mouth as needed for Pain., Print, Disp-100 mL, R-1 Hydrochlorothiazide (HYDRODIURIL) 12.5 mg tablet Take 12.5 mg by mouth daily. , Historical Med 2. Follow-up Information Follow up With Specialties Details Why Contact Info  
 your primary care [facility] EM[facility] Emergency Medicine  If symptoms worsen [address] [place] [address] [address]  [facility] 
[phone] Return to ED if worse Diagnosis Clinical Impression: 1. Acute bronchitis, unspecified organism 2. Acute pharyngitis, unspecified etiology 3. Elevated blood pressure reading

## 2019-05-06 ENCOUNTER — APPOINTMENT (OUTPATIENT)
Dept: CT IMAGING | Age: 49
End: 2019-05-06
Attending: EMERGENCY MEDICINE
Payer: COMMERCIAL

## 2019-05-06 ENCOUNTER — HOSPITAL ENCOUNTER (EMERGENCY)
Age: 49
Discharge: HOME | End: 2019-05-06
Attending: EMERGENCY MEDICINE
Payer: COMMERCIAL

## 2019-05-06 VITALS
TEMPERATURE: 98.3 F | OXYGEN SATURATION: 97 % | HEIGHT: 62 IN | WEIGHT: 213.41 LBS | RESPIRATION RATE: 18 BRPM | DIASTOLIC BLOOD PRESSURE: 109 MMHG | SYSTOLIC BLOOD PRESSURE: 196 MMHG | HEART RATE: 67 BPM | BODY MASS INDEX: 39.27 KG/M2

## 2019-05-06 DIAGNOSIS — R30.0 DYSURIA: Primary | ICD-10-CM

## 2019-05-06 LAB
ALBUMIN SERPL-MCNC: 3.8 G/DL (ref 3.5–5)
ALBUMIN/GLOB SERPL: 1 {RATIO} (ref 1.1–2.2)
ALP SERPL-CCNC: 89 U/L (ref 45–117)
ALT SERPL-CCNC: 22 U/L (ref 12–78)
ANION GAP SERPL CALC-SCNC: 6 MMOL/L (ref 5–15)
APPEARANCE UR: CLEAR
AST SERPL-CCNC: 25 U/L (ref 15–37)
BACTERIA URNS QL MICRO: NEGATIVE /HPF
BASOPHILS # BLD: 0.1 K/UL (ref 0–0.1)
BASOPHILS NFR BLD: 1 % (ref 0–1)
BILIRUB SERPL-MCNC: 0.4 MG/DL (ref 0.2–1)
BILIRUB UR QL: NEGATIVE
BUN SERPL-MCNC: 9 MG/DL (ref 6–20)
BUN/CREAT SERPL: 13 (ref 12–20)
CALCIUM SERPL-MCNC: 9.1 MG/DL (ref 8.5–10.1)
CHLORIDE SERPL-SCNC: 104 MMOL/L (ref 97–108)
CO2 SERPL-SCNC: 29 MMOL/L (ref 21–32)
COLOR UR: ABNORMAL
COMMENT, HOLDF: NORMAL
CREAT SERPL-MCNC: 0.71 MG/DL (ref 0.55–1.02)
DIFFERENTIAL METHOD BLD: ABNORMAL
EOSINOPHIL # BLD: 0.1 K/UL (ref 0–0.4)
EOSINOPHIL NFR BLD: 1 % (ref 0–7)
EPITH CASTS URNS QL MICRO: ABNORMAL /LPF
ERYTHROCYTE [DISTWIDTH] IN BLOOD BY AUTOMATED COUNT: 14.7 % (ref 11.5–14.5)
GLOBULIN SER CALC-MCNC: 3.9 G/DL (ref 2–4)
GLUCOSE SERPL-MCNC: 92 MG/DL (ref 65–100)
GLUCOSE UR STRIP.AUTO-MCNC: NEGATIVE MG/DL
HCT VFR BLD AUTO: 35.2 % (ref 35–47)
HGB BLD-MCNC: 12 G/DL (ref 11.5–16)
HGB UR QL STRIP: NEGATIVE
HYALINE CASTS URNS QL MICRO: ABNORMAL /LPF (ref 0–5)
IMM GRANULOCYTES # BLD AUTO: 0.1 K/UL (ref 0–0.04)
IMM GRANULOCYTES NFR BLD AUTO: 1 % (ref 0–0.5)
KETONES UR QL STRIP.AUTO: NEGATIVE MG/DL
LACTATE SERPL-SCNC: 0.9 MMOL/L (ref 0.4–2)
LEUKOCYTE ESTERASE UR QL STRIP.AUTO: ABNORMAL
LYMPHOCYTES # BLD: 2.5 K/UL (ref 0.8–3.5)
LYMPHOCYTES NFR BLD: 28 % (ref 12–49)
MCH RBC QN AUTO: 28 PG (ref 26–34)
MCHC RBC AUTO-ENTMCNC: 34.1 G/DL (ref 30–36.5)
MCV RBC AUTO: 82.2 FL (ref 80–99)
MONOCYTES # BLD: 0.5 K/UL (ref 0–1)
MONOCYTES NFR BLD: 6 % (ref 5–13)
NEUTS SEG # BLD: 5.6 K/UL (ref 1.8–8)
NEUTS SEG NFR BLD: 63 % (ref 32–75)
NITRITE UR QL STRIP.AUTO: NEGATIVE
NRBC # BLD: 0 K/UL (ref 0–0.01)
NRBC BLD-RTO: 0 PER 100 WBC
PH UR STRIP: 7.5 [PH] (ref 5–8)
PLATELET # BLD AUTO: 309 K/UL (ref 150–400)
PMV BLD AUTO: 9 FL (ref 8.9–12.9)
POTASSIUM SERPL-SCNC: 3.1 MMOL/L (ref 3.5–5.1)
PROT SERPL-MCNC: 7.7 G/DL (ref 6.4–8.2)
PROT UR STRIP-MCNC: NEGATIVE MG/DL
RBC # BLD AUTO: 4.28 M/UL (ref 3.8–5.2)
RBC #/AREA URNS HPF: ABNORMAL /HPF (ref 0–5)
SAMPLES BEING HELD,HOLD: NORMAL
SODIUM SERPL-SCNC: 139 MMOL/L (ref 136–145)
SP GR UR REFRACTOMETRY: 1.01 (ref 1–1.03)
UA: UC IF INDICATED,UAUC: ABNORMAL
UROBILINOGEN UR QL STRIP.AUTO: 0.2 EU/DL (ref 0.2–1)
WBC # BLD AUTO: 8.8 K/UL (ref 3.6–11)
WBC URNS QL MICRO: ABNORMAL /HPF (ref 0–4)

## 2019-05-06 PROCEDURE — 85025 COMPLETE CBC W/AUTO DIFF WBC: CPT

## 2019-05-06 PROCEDURE — 80053 COMPREHEN METABOLIC PANEL: CPT

## 2019-05-06 PROCEDURE — 81001 URINALYSIS AUTO W/SCOPE: CPT

## 2019-05-06 PROCEDURE — 36415 COLL VENOUS BLD VENIPUNCTURE: CPT

## 2019-05-06 PROCEDURE — 74176 CT ABD & PELVIS W/O CONTRAST: CPT

## 2019-05-06 PROCEDURE — 99284 EMERGENCY DEPT VISIT MOD MDM: CPT

## 2019-05-06 PROCEDURE — 83605 ASSAY OF LACTIC ACID: CPT

## 2019-05-06 RX ORDER — PHENAZOPYRIDINE HYDROCHLORIDE 200 MG/1
200 TABLET, FILM COATED ORAL 3 TIMES DAILY
Qty: 6 TAB | Refills: 0 | Status: SHIPPED | OUTPATIENT
Start: 2019-05-06 | End: 2019-05-08

## 2019-05-06 RX ORDER — SODIUM CHLORIDE 0.9 % (FLUSH) 0.9 %
5-40 SYRINGE (ML) INJECTION EVERY 8 HOURS
Status: DISCONTINUED | OUTPATIENT
Start: 2019-05-06 | End: 2019-05-07 | Stop reason: HOSPADM

## 2019-05-06 RX ORDER — SODIUM CHLORIDE 0.9 % (FLUSH) 0.9 %
5-40 SYRINGE (ML) INJECTION AS NEEDED
Status: DISCONTINUED | OUTPATIENT
Start: 2019-05-06 | End: 2019-05-07 | Stop reason: HOSPADM

## 2019-05-06 RX ORDER — CEPHALEXIN 500 MG/1
500 CAPSULE ORAL 3 TIMES DAILY
Qty: 15 CAP | Refills: 0 | Status: SHIPPED | OUTPATIENT
Start: 2019-05-06

## 2019-05-06 NOTE — ED NOTES
Patient resting quietly, water and crackers provided per patient request. Denies any further needs at this time.

## 2019-05-07 NOTE — DISCHARGE INSTRUCTIONS
Patient Education        Painful Urination (Dysuria): Care Instructions  Your Care Instructions  Burning pain with urination (dysuria) is a common symptom of a urinary tract infection or other urinary problems. The bladder may become inflamed. This can cause pain when the bladder fills and empties. You may also feel pain if the tube that carries urine from the bladder to the outside of the body (urethra) gets irritated or infected. Sexually transmitted infections (STIs) also may cause pain when you urinate. Sometimes the pain can be caused by things other than an infection. The urethra can be irritated by soaps, perfumes, or foreign objects in the urethra. Kidney stones can cause pain when they pass through the urethra. The cause may be hard to find. You may need tests. Treatment for painful urination depends on the cause. Follow-up care is a key part of your treatment and safety. Be sure to make and go to all appointments, and call your doctor if you are having problems. It's also a good idea to know your test results and keep a list of the medicines you take. How can you care for yourself at home? · Drink extra water for the next day or two. This will help make the urine less concentrated. (If you have kidney, heart, or liver disease and have to limit fluids, talk with your doctor before you increase the amount of fluids you drink.)  · Avoid drinks that are carbonated or have caffeine. They can irritate the bladder. · Urinate often. Try to empty your bladder each time. For women:  · Urinate right after you have sex. · After going to the bathroom, wipe from front to back. · Avoid douches, bubble baths, and feminine hygiene sprays. And avoid other feminine hygiene products that have deodorants. When should you call for help? Call your doctor now or seek immediate medical care if:    · You have new symptoms, such as fever, nausea, or vomiting.     · You have new or worse symptoms of a urinary problem.  For example:  ? You have blood or pus in your urine. ? You have chills or body aches. ? It hurts worse to urinate. ? You have groin or belly pain. ? You have pain in your back just below your rib cage (the flank area).    Watch closely for changes in your health, and be sure to contact your doctor if you have any problems. Where can you learn more? Go to http://ilya-cristi.info/. Enter P896 in the search box to learn more about \"Painful Urination (Dysuria): Care Instructions. \"  Current as of: March 20, 2018  Content Version: 11.9  © 0759-2277 [employer]. Care instructions adapted under license by [employer] (which disclaims liability or warranty for this information). If you have questions about a medical condition or this instruction, always ask your healthcare professional. [address] any warranty or liability for your use of this information.

## 2019-05-07 NOTE — ED NOTES
Patient discharged to home, physician reviewed instructions, denies any further questions at this time.

## 2019-05-10 NOTE — ED PROVIDER NOTES
EMERGENCY DEPARTMENT HISTORY AND PHYSICAL EXAM 
 
 
Date: 5/6/2019 Patient Name: Fay Flowers History of Presenting Illness Chief Complaint Patient presents with  Bladder Infection \"I think I have a UTI\". Right sided CVA tenderness. 5/10 suprapubic and right flank pain. + frequency, +dysuria, + flank pain, + chills, - hematuria, - odor, History Provided By: Patient HPI: Fay Flowers, 44 y.o. female with PMHx significant for hypertension, high cholesterol, GERD, presents by POV to the ED with cc of painful and frequent urination. Patient states onset today with painful urination and frequency. Patient also describes flank pain on the left rated at a 5 out of 10. There are no alleviating or exacerbating factors to the flank pain. Discomfort described as sharp in nature. Patient states she has had frequency of urination with decreased output with each trip to the bathroom. In addition there is also discomfort at the end of her stream.  Patient denies any fever. Patient denies any nausea vomiting or diarrhea. Patient has no chest pain or shortness of breath patient denies any recent hematuria or blood in her stool. There are no other complaints, changes, or physical findings at this time. PCP: Other, MD Jonny 
 
No current facility-administered medications on file prior to encounter. Current Outpatient Medications on File Prior to Encounter Medication Sig Dispense Refill  predniSONE (STERAPRED DS) 10 mg dose pack Take as directed 21 Tab 0  
 hydroCHLOROthiazide 12.5 mg cap 12.5 mg, lisinopril 5 mg tab 10 mg Take 1 Dose by mouth daily.  methylPREDNISolone (MEDROL, OTILIA,) 4 mg tablet As directed 1 Dose Pack 0  
 naproxen (NAPROSYN) 500 mg tablet Take 1 Tab by mouth every twelve (12) hours as needed for Pain. 20 Tab 0  
 rosuvastatin (CRESTOR) 10 mg tablet Take 10 mg by mouth nightly.  esomeprazole (NEXIUM) 20 mg capsule Take 20 mg by mouth daily.  lidocaine (XYLOCAINE) 2 % solution Take 10 mL by mouth as needed for Pain. 100 mL 1 Past History Past Medical History: 
Past Medical History:  
Diagnosis Date  Gastrointestinal disorder GERD  Hypertension  Ill-defined condition High Cholesterol Past Surgical History: 
Past Surgical History:  
Procedure Laterality Date  BREAST SURGERY PROCEDURE UNLISTED    
 reduction  HX CHOLECYSTECTOMY  HX GYN    
 c section Family History: 
History reviewed. No pertinent family history. Social History: 
Social History Tobacco Use  Smoking status: Never Smoker  Smokeless tobacco: Never Used Substance Use Topics  Alcohol use: No  
 Drug use: No  
 
 
Allergies: 
No Known Allergies Review of Systems Review of Systems Constitutional: Negative. Negative for appetite change, chills, fatigue and fever. HENT: Negative. Negative for congestion, rhinorrhea, sinus pressure and sore throat. Eyes: Negative. Respiratory: Negative. Negative for cough, choking, chest tightness, shortness of breath and wheezing. Cardiovascular: Negative. Negative for chest pain, palpitations and leg swelling. Gastrointestinal: Negative for abdominal pain, constipation, diarrhea, nausea and vomiting. Endocrine: Negative. Genitourinary: Positive for decreased urine volume, difficulty urinating, dysuria, flank pain, frequency and urgency. Negative for hematuria, menstrual problem, vaginal bleeding and vaginal discharge. Skin: Negative. Neurological: Negative. Negative for dizziness, speech difficulty, weakness, light-headedness, numbness and headaches. Psychiatric/Behavioral: Negative. All other systems reviewed and are negative. Physical Exam  
Physical Exam  
Constitutional: She is oriented to person, place, and time. She appears well-developed and well-nourished. No distress. HENT:  
Head: Normocephalic and atraumatic. Mouth/Throat: Oropharynx is clear and moist. No oropharyngeal exudate. Eyes: Pupils are equal, round, and reactive to light. Conjunctivae and EOM are normal.  
Neck: Normal range of motion. Neck supple. No JVD present. No tracheal deviation present. Cardiovascular: Normal rate, regular rhythm, normal heart sounds and intact distal pulses. No murmur heard. Pulmonary/Chest: Effort normal and breath sounds normal. No stridor. No respiratory distress. She has no wheezes. She has no rales. She exhibits no tenderness. Abdominal: Soft. She exhibits no distension. There is tenderness. There is no rebound and no guarding. Obese, mild suprapubic tenderness Musculoskeletal: Normal range of motion. She exhibits no edema or tenderness. Neurological: She is alert and oriented to person, place, and time. No cranial nerve deficit. No gross motor or sensory deficits Skin: Skin is warm and dry. She is not diaphoretic. Psychiatric: She has a normal mood and affect. Her behavior is normal.  
Nursing note and vitals reviewed. Diagnostic Study Results Labs - 
Contains abnormal data CBC WITH AUTOMATED DIFF (Final result)  
 Component (Lab Inquiry) Collection Time Result Time WBC RBC HGB HCT MCV  
05/06/19 17:54:00 05/06/19 18:20:59 8.8 4.28 12.0 35.2 82.2  
   
Collection Time Result Time MCH MCHC RDW PLT MPLV  
05/06/19 17:54:00 05/06/19 18:20:59 28.0 34.1 14.7High  309 9.0  
   
Collection Time Result Time NRBC ANRBC GRANS LYMPH MONOS  
05/06/19 17:54:00 05/06/19 18:20:59 0.0 0.00 63 28 6  
   
Collection Time Result Time EOS BASOS IG ABG ABL  
05/06/19 17:54:00 05/06/19 18:20:59 1 1 1High  5.6 2.5  
   
Collection Time Result Time ABM TRA ABB AIG DF  
05/06/19 17:54:00 05/06/19 18:20:59 0.5 0.1 0.1 0.1High  AUTOMATED  
   
Final result  
   
  
  
  
   
   
Contains abnormal data METABOLIC PANEL, COMPREHENSIVE (Final result)  
 Component (Lab Inquiry) Collection Time Result Time NA K CL CO2 AGAP  
05/06/19 17:54:00 05/06/19 18:44:53 139 3.1Low  104 29 6  
   
Collection Time Result Time GLU BUN CREA BUCR GFRAA  
05/06/19 17:54:00 05/06/19 18:44:53 92 9 0.71 13 >60  
   
Collection Time Result Time GFRNA CA TBIL GPT SGOT  
05/06/19 17:54:00 05/06/19 18:44:53 >60 Estimated GFR is calcu. .. 9.1 0.4 22 25  
   
Collection Time Result Time AP TP ALB GLOB AGRAT  
05/06/19 17:54:00 05/06/19 18:44:53 89 7.7 3.8 3.9 1.0Low   
  Final result  
   
  
  
  
   
   
SAMPLES BEING HELD (Final result)  
 Component (Lab Inquiry) Collection Time Result Time HOLD HOLDF  
05/06/19 17:54:00 05/06/19 18:14:35 BL RD DKGRN Add-on orders for these samples will be processed based on acceptable specimen integrity and analyte stability, which may vary by analyte.  
 
   
Final result  
   
  
  
  
   
 
   
LACTIC ACID (Final result)  
 Component (Lab Inquiry) Collection Time Result Time LAC  
05/06/19 17:54:00 05/06/19 18:44:33 0.9  
 
   
Final result  
   
  
  
  
   
 
   
Contains abnormal data URINALYSIS W/ REFLEX CULTURE (Final result)  
 Component (Lab Inquiry) Collection Time Result Time COLOR APPRN REFSG GINNY PROTU  
05/06/19 17:32:00 05/06/19 18:00:16 YELLOW/STRAW Color Reference Range:. .. CLEAR 1.008 7.5 NEGATIVE   
   
Collection Time Result Time GLUCU KETU BILU BLDU UROU  
05/06/19 17:32:00 05/06/19 18:00:16 NEGATIVE  NEGATIVE  NEGATIVE  NEGATIVE  0.2  
   
Collection Time Result Time CHRISTEL LEUKU WBCU RBCU EPSU  
05/06/19 17:32:00 05/06/19 18:00:16 NEGATIVE  TRACEAbnormal  0-4 0-5 FEW Epithelial cell catego. ..    
Collection Time Result Time Frank Flores HYCST  
05/06/19 17:32:00 05/06/19 18:00:16 NEGATIVE  CULTURE NOT INDICATED BY UA RESULT 0-2  
   
Final result Radiologic Studies -  
CT ABD PELV WO CONT Final Result IMPRESSION:  
  
1. No renal stone or hydronephrosis 2. 3.5 cm indeterminate mass right hepatic lobe. Nonemergent MR imaging is recommended CT Results  (Last 48 hours) None CXR Results  (Last 48 hours) None Medical Decision Making I am the first provider for this patient. I reviewed the vital signs, available nursing notes, past medical history, past surgical history, family history and social history. Vital Signs-Reviewed the patient's vital signs. No data found. Pulse Oximetry Analysis - 97% on RA Records Reviewed: Nursing Notes and Old Medical Records Provider Notes (Medical Decision Making): DDX-UTI, bladder spasm, interstitial cystitis ED Course:  
Initial assessment performed. The patients presenting problems have been discussed, and they are in agreement with the care plan formulated and outlined with them. I have encouraged them to ask questions as they arise throughout their visit. Urinalysis results discussed with the patient. Patient symptoms more consistent with dysuria no infection noted in the urinalysis here. Discussed with patient Pyridium for the next 24 hours to see if the symptoms resolve the antibiotic has been written in case she continues to have symptoms. We discussed follow-up with urology for potential cystoscopy to further evaluate bladder wall. Discussed incidental finding on CT. Patient is aware of the liver lesion this is being followed by her primary care physician already. Critical Care Time:  
None Disposition: 
Discharge home PLAN: 
1. Discharge Medication List as of 5/6/2019  8:57 PM  
  
START taking these medications Details  
phenazopyridine (PYRIDIUM) 200 mg tablet Take 1 Tab by mouth three (3) times daily for 6 doses. , Normal, Disp-6 Tab, R-0  
  
cephALEXin (KEFLEX) 500 mg capsule Take 1 Cap by mouth three (3) times daily. , Normal, Disp-15 Cap, R-0  
  
  
CONTINUE these medications which have NOT CHANGED  Details  
predniSONE (STERAPRED DS) 10 mg dose pack Take as directed, Print, Disp-21 Tab, R-0  
  
 hydroCHLOROthiazide 12.5 mg cap 12.5 mg, lisinopril 5 mg tab 10 mg Take 1 Dose by mouth daily. , Historical Med  
  
methylPREDNISolone (MEDROL, OTILIA,) 4 mg tablet As directed, Normal, Disp-1 Dose Pack, R-0  
  
naproxen (NAPROSYN) 500 mg tablet Take 1 Tab by mouth every twelve (12) hours as needed for Pain., Normal, Disp-20 Tab, R-0  
  
rosuvastatin (CRESTOR) 10 mg tablet Take 10 mg by mouth nightly., Historical Med  
  
esomeprazole (NEXIUM) 20 mg capsule Take 20 mg by mouth daily. , Historical Med  
  
lidocaine (XYLOCAINE) 2 % solution Take 10 mL by mouth as needed for Pain., Print, Disp-100 mL, R-1  
  
  
 
2. Follow-up Information Follow up With Specialties Details Why Contact Info Other, MD Jonny   As needed Patient can only remember the practice name and not the physician Return to ED if worse Diagnosis Clinical Impression: 1. Dysuria